# Patient Record
(demographics unavailable — no encounter records)

---

## 2025-06-19 NOTE — DATA REVIEWED
[FreeTextEntry1] : Patient: FABIOLA SPICER YOB: 1977 Phone: [phone]  MRN: [id number] Acc: [id number] Date of Exam: 06- EXAM:  US HERNIA ABDOMINAL WALL HISTORY:  Palpable lump left lateral lumbar region. Rule out hernia TECHNIQUE:  Sonography of the lump COMPARISON:  None FINDINGS:  A 1.4 x 0.3 x 1 cm solid mass with a area of increased echogenicity within the midportion corresponds to palpable lump. No appreciable increase in vascularity is noted. A hernia is not visualized.  IMPRESSION: Palpable lump is indeterminant on sonography. Possibilities include a lymph node. Further evaluation should depend on clinical grounds. Follow-up sonogram recommended in 3 months time for reevaluation.

## 2025-06-19 NOTE — PHYSICAL EXAM
[No Rash or Lesion] : No rash or lesion [Alert] : alert [Oriented to Person] : oriented to person [Oriented to Place] : oriented to place [Oriented to Time] : oriented to time [Calm] : calm [de-identified] : A/Ox3; NAD. appears comfortable  [de-identified] : airway patent, no use of accessory muscles  [de-identified] : abd soft NTND small nodule, mobile, L side of abdominal wall

## 2025-06-19 NOTE — CONSULT LETTER
[Dear  ___] : Dear  [unfilled], [Consult Letter:] : I had the pleasure of evaluating your patient, [unfilled]. [Consult Closing:] : Thank you very much for allowing me to participate in the care of this patient.  If you have any questions, please do not hesitate to contact me. [Sincerely,] : Sincerely, [FreeTextEntry3] : Roger Ware MD General Surgery

## 2025-06-19 NOTE — PHYSICAL EXAM
[No Rash or Lesion] : No rash or lesion [Alert] : alert [Oriented to Person] : oriented to person [Oriented to Place] : oriented to place [Oriented to Time] : oriented to time [Calm] : calm [de-identified] : A/Ox3; NAD. appears comfortable  [de-identified] : airway patent, no use of accessory muscles  [de-identified] : abd soft NTND small nodule, mobile, L side of abdominal wall

## 2025-06-19 NOTE — PLAN
[FreeTextEntry1] : findings discussed w the patient pt will monitor /obs the area for any change in symptoms, or increase in size and return for follow up visit    Patient's questions and concerns addressed to their satisfaction, and patient verbalized an understanding of the information discussed.

## 2025-06-19 NOTE — DATA REVIEWED
[FreeTextEntry1] : Patient: FABIOLA SPICER YOB: 1977 Phone: [phone]  MRN: [id number] Acc: [id number] Date of Exam: 06- EXAM:  US HERNIA ABDOMINAL WALL HISTORY:  Palpable lump left lateral lumbar region. Rule out hernia TECHNIQUE:  Sonography of the lump COMPARISON:  None FINDINGS:  A 1.4 x 0.3 x 1 cm solid mass with a area of increased echogenicity within the midportion corresponds to palpable lump. No appreciable increase in vascularity is noted. A hernia is not visualized.  IMPRESSION: Palpable lump is indeterminant on sonography. Possibilities include a lymph node. Further evaluation should depend on clinical grounds. Follow-up sonogram recommended in 3 months time for reevaluation.

## 2025-06-19 NOTE — ASSESSMENT
[FreeTextEntry1] : IMP: 46 yo F w small nodular mass to L side of abdominal wall. The lump had decreased in size, after menstrual cycle. No palpable hernias noted on exam.

## 2025-06-19 NOTE — HISTORY OF PRESENT ILLNESS
[de-identified] : Ms. SPICER is a 47 year y/o F here for consult visit w cc of having a lump to the abdominal wall, w swelling and discomfort, to the left of umbilicus.  Pt recently felt the lump at the time of menstruation, a/w discomfort during that time. It decreased in size after menstrual cycle.   PSH: Abdominoplasty 2017

## 2025-06-19 NOTE — HISTORY OF PRESENT ILLNESS
[de-identified] : Ms. SPICER is a 47 year y/o F here for consult visit w cc of having a lump to the abdominal wall, w swelling and discomfort, to the left of umbilicus.  Pt recently felt the lump at the time of menstruation, a/w discomfort during that time. It decreased in size after menstrual cycle.   PSH: Abdominoplasty 2017